# Patient Record
Sex: FEMALE | Race: WHITE | NOT HISPANIC OR LATINO
[De-identification: names, ages, dates, MRNs, and addresses within clinical notes are randomized per-mention and may not be internally consistent; named-entity substitution may affect disease eponyms.]

---

## 2019-03-09 ENCOUNTER — RX RENEWAL (OUTPATIENT)
Age: 74
End: 2019-03-09

## 2019-03-09 DIAGNOSIS — G47.52 REM SLEEP BEHAVIOR DISORDER: ICD-10-CM

## 2019-04-04 ENCOUNTER — APPOINTMENT (OUTPATIENT)
Dept: NEUROLOGY | Facility: CLINIC | Age: 74
End: 2019-04-04
Payer: MEDICARE

## 2019-04-04 VITALS
WEIGHT: 106 LBS | BODY MASS INDEX: 18.78 KG/M2 | TEMPERATURE: 97.7 F | SYSTOLIC BLOOD PRESSURE: 104 MMHG | OXYGEN SATURATION: 99 % | HEART RATE: 79 BPM | HEIGHT: 63 IN | DIASTOLIC BLOOD PRESSURE: 70 MMHG

## 2019-04-04 PROCEDURE — 99214 OFFICE O/P EST MOD 30 MIN: CPT

## 2019-04-04 PROCEDURE — 95983 ALYS BRN NPGT PRGRMG 15 MIN: CPT

## 2019-04-04 NOTE — PHYSICAL EXAM
[Motor Strength] : muscle strength was normal in all four extremities [FreeTextEntry1] : There is mild masking. Speech is 2+. There is no tremor. Her right side movements are slightly dyskinetic. Left UE 2+ and leg taps 2.5+. Tone is normal. Walks with depressed left armswing. Pull test is negative

## 2019-04-04 NOTE — DISCUSSION/SUMMARY
[FreeTextEntry1] : Patient with mild right side dyskinesia with possible dystonic wearing off component. Left side bradykinesia, I suspect, is causing her to be more aware of her right side motoric improvements and subtle dyskinesia as she does not have any focal motor weakness. \par \par Overnight hypokinesia\par \par Fecal incontinence may be 2/2 constipation\par \par Recommendations\par - DBS adjusted\par DBS programming 10minutes.\par - increase 1PM sinemet dose to 1.5 tabs\par - increase sinemet CR 25/100 1.5 tabs qhs\par f/u with GI regarding w/u for FI\par

## 2019-04-04 NOTE — HISTORY OF PRESENT ILLNESS
[FreeTextEntry1] : Patient with PD s/p bilateral STN DBS who returns for followup. Patient reports that she is cycling for PD. States that her left leg agility is not as good as her right leg. However, in general her right side feels weaker. Around 5PM, her right arm postures and improves after taking extra tab of sinemet. This occurs 3-4 times per week. She feels that her balance has improved and has no falls. Denies any freezing episodes. For the past two weeks she has been having episodes of fecal incontinence. Sleeps well with klonopin with no AM grogginess. When she takes her sinemet ER early, she feels that it does not last through the night as she can experience some overnight stiffness and slowness. \par \par PD meds:\par sinemet 25/100 1 tab 5 times per day (6-10-2-5-+/-\par sinemet 25/100 1 tab @730PM\par bupropion 75mg BID\par klonopin 1mg qhs\par \par

## 2019-04-08 ENCOUNTER — RX RENEWAL (OUTPATIENT)
Age: 74
End: 2019-04-08

## 2019-04-08 DIAGNOSIS — F32.9 MAJOR DEPRESSIVE DISORDER, SINGLE EPISODE, UNSPECIFIED: ICD-10-CM

## 2019-05-01 ENCOUNTER — RX RENEWAL (OUTPATIENT)
Age: 74
End: 2019-05-01

## 2019-05-28 ENCOUNTER — RX RENEWAL (OUTPATIENT)
Age: 74
End: 2019-05-28

## 2019-06-28 ENCOUNTER — RX RENEWAL (OUTPATIENT)
Age: 74
End: 2019-06-28

## 2019-07-18 ENCOUNTER — APPOINTMENT (OUTPATIENT)
Dept: NEUROLOGY | Facility: CLINIC | Age: 74
End: 2019-07-18
Payer: MEDICARE

## 2019-07-18 VITALS
BODY MASS INDEX: 19.14 KG/M2 | TEMPERATURE: 98 F | HEART RATE: 84 BPM | OXYGEN SATURATION: 98 % | WEIGHT: 108.03 LBS | HEIGHT: 63 IN

## 2019-07-18 VITALS — SYSTOLIC BLOOD PRESSURE: 121 MMHG | DIASTOLIC BLOOD PRESSURE: 86 MMHG

## 2019-07-18 PROCEDURE — 99214 OFFICE O/P EST MOD 30 MIN: CPT

## 2019-07-18 PROCEDURE — 95983 ALYS BRN NPGT PRGRMG 15 MIN: CPT

## 2019-07-19 NOTE — DISCUSSION/SUMMARY
[FreeTextEntry1] : PD with b/l STN DBS with increased off time. Suspect delay absorption due to slow gut transit. \par \par Plan\par DBS adjusted\par Alternate dose of sinemet IR 1.5 and 1 tab q3hrs (5doses/d)\par cont sinemet ER 25/100 1.5 tab @qhs\par f/u GI for constipation management \par cont exercising\par \par RTO 6months\par

## 2019-07-19 NOTE — HISTORY OF PRESENT ILLNESS
[FreeTextEntry1] : Patient with PD s/p bilateral STN DBS who returns for followup.\par \par She has intermittent left hand tremors in the late evenings. [facility] and has some problems raising her left foot when walking. This worsens as the day progresses\par Takes c/l 20 mninutes to kick in\par She reports that her vision is blurry constantly. \par Miralax and metamucil was prescribed by GI with some benefit. But only has weekly BMs\par Sinemet ER has improved overnight slowness and RLS. \par \par PD meds:\par sinemet 25/100 1 tab 5 times per day (71030-130, 430), 1.5 tabs @7P\par sinemet ER 25/100 1.5 tab @qhs\par bupropion 75mg BID\par klonopin 1mg qhs\par \par

## 2019-07-19 NOTE — PHYSICAL EXAM
[FreeTextEntry1] : There is mild masking. EOMI. Tremor is absent. There is mild left side bradykinesia. TOne is normal. Walks with good SL and no FOG. Left armswing is depressed

## 2019-07-19 NOTE — PROCEDURE
[FreeTextEntry1] : Deep Brain Stimulation Programming: Refer to scanned form(s)\par R IPG 2.90V\par 1-2+: 2.7mA/70/130 --->2.9mA\par \par L IPG-2.94\par 1-2.1V/6-/130\par

## 2019-07-26 ENCOUNTER — RX RENEWAL (OUTPATIENT)
Age: 74
End: 2019-07-26

## 2019-07-28 ENCOUNTER — RX RENEWAL (OUTPATIENT)
Age: 74
End: 2019-07-28

## 2019-08-07 ENCOUNTER — TRANSCRIPTION ENCOUNTER (OUTPATIENT)
Age: 74
End: 2019-08-07

## 2019-08-26 ENCOUNTER — RX RENEWAL (OUTPATIENT)
Age: 74
End: 2019-08-26

## 2019-09-04 ENCOUNTER — RX RENEWAL (OUTPATIENT)
Age: 74
End: 2019-09-04

## 2019-09-23 ENCOUNTER — RX RENEWAL (OUTPATIENT)
Age: 74
End: 2019-09-23

## 2019-10-25 ENCOUNTER — RX RENEWAL (OUTPATIENT)
Age: 74
End: 2019-10-25

## 2019-11-25 ENCOUNTER — RX RENEWAL (OUTPATIENT)
Age: 74
End: 2019-11-25

## 2019-11-25 ENCOUNTER — MEDICATION RENEWAL (OUTPATIENT)
Age: 74
End: 2019-11-25

## 2019-12-24 ENCOUNTER — RX RENEWAL (OUTPATIENT)
Age: 74
End: 2019-12-24

## 2020-01-23 ENCOUNTER — APPOINTMENT (OUTPATIENT)
Dept: NEUROLOGY | Facility: CLINIC | Age: 75
End: 2020-01-23
Payer: MEDICARE

## 2020-01-23 VITALS
HEART RATE: 90 BPM | OXYGEN SATURATION: 95 % | DIASTOLIC BLOOD PRESSURE: 62 MMHG | HEIGHT: 63 IN | TEMPERATURE: 97.1 F | SYSTOLIC BLOOD PRESSURE: 95 MMHG | WEIGHT: 108 LBS | BODY MASS INDEX: 19.14 KG/M2

## 2020-01-23 DIAGNOSIS — H04.123 DRY EYE SYNDROME OF BILATERAL LACRIMAL GLANDS: ICD-10-CM

## 2020-01-23 DIAGNOSIS — Z87.19 PERSONAL HISTORY OF OTHER DISEASES OF THE DIGESTIVE SYSTEM: ICD-10-CM

## 2020-01-23 PROCEDURE — 95983 ALYS BRN NPGT PRGRMG 15 MIN: CPT

## 2020-01-23 PROCEDURE — 99214 OFFICE O/P EST MOD 30 MIN: CPT

## 2020-01-23 RX ORDER — GABAPENTIN 100 MG/1
100 CAPSULE ORAL
Qty: 90 | Refills: 2 | Status: ACTIVE | COMMUNITY
Start: 2020-01-23 | End: 1900-01-01

## 2020-01-23 NOTE — PROCEDURE
[FreeTextEntry1] : Deep Brain Stimulation Programming:  \par \par R IPG - 2.87V battery\par 1- 2+\par 2.9mA/70/130 \par \par L IPG - 2.92 battery\par C+ 1- \par 2.1V/60/130\par Increase amplitude to 2.3 V

## 2020-01-23 NOTE — END OF VISIT
[>50% of Time Spent on Counseling for ____] : Greater than 50% of the encounter time was spent on counseling for [unfilled] [Time Spent: ___ minutes] : I have spent [unfilled] minutes of face to face time with the patient [] : Fellow [FreeTextEntry3] : Patient counseled on DBS changes and other recommendations

## 2020-01-23 NOTE — HISTORY OF PRESENT ILLNESS
[FreeTextEntry1] : Patient with Hx of PD since 1998 with symptoms since 1994, started Sinemet in 1995, s/p bilateral STN DBS who returns for followup.\par \par Parkinson's wise she feels stable. She feels her right side is weaker. She fell the week before [place] on her right side, no injury. She did not trip, she just fell suddenly. No dizziness when standing. Alternating doses she has not a big difference. She is getting restless leg in her left leg every night. Gabapentin has helped in the past. She is stiffer in the morning, but still independent in ADLs. The Sinemet ER is Not helping with RLS. After her 5pm dose, she notices her right shoulder moving involuntarily.\par \par Takes c/l 20 minutes to kick in\par She reports that her vision is blurry constantly. She was diagnosed with macular degeneration and a hole in her retina that was repaired.\par She has dry mouth, eyes and skin. Currently undergoing work up for skin cancer.\par Miralax daily was prescribed by GI, but has more diarrhea type stools. But only has weekly BMs usually, but she can then have diarrhea for 3 days.\par \par She plans to restart pedalling for Parkinson's,\par \par PD meds:\par sinemet 25/100 1.5 and 1 tab alternating, 5 times per day (7-1030-130, 5p, 7P)\par sinemet ER 25/100 1.5 tab @qhs\par bupropion 75mg BID\par klonopin 1mg qhs\par \par

## 2020-01-23 NOTE — DISCUSSION/SUMMARY
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 Had retinal tear repaired. Getting worked up for skin cancer. Discussed that these findings are likely not related to her Parkinson's disease.\par \par Plan\par - L DBS adjusted\par - Continue Alternate dose of sinemet IR 1.5 and 1 tab q3hrs (5 doses/d)\par - cont sinemet ER 25/100 1.5 tab @qhs\par - f/u GI for constipation management \par - Increase exercise\par - Sjogren's syndrome Antibodies for symptoms of dry eyes/mouth\par - Start Gabapentin 100mg at bedtime, can titrate up to 3 tabs at bedtime if needed\par \par RTO 6 months

## 2020-01-23 NOTE — PHYSICAL EXAM
[FreeTextEntry1] : There is mild masking. EOMI. Tremor is absent. There is mild left side bradykinesia. Tone is normal. Walks with good SL and no FOG. Postural reflexes are normal. Intermittent, right hand and right leg dyskinesias, intermittently the head is involved.

## 2020-02-19 ENCOUNTER — RX RENEWAL (OUTPATIENT)
Age: 75
End: 2020-02-19

## 2020-03-23 ENCOUNTER — TRANSCRIPTION ENCOUNTER (OUTPATIENT)
Age: 75
End: 2020-03-23

## 2020-04-21 ENCOUNTER — TRANSCRIPTION ENCOUNTER (OUTPATIENT)
Age: 75
End: 2020-04-21

## 2020-05-21 ENCOUNTER — RX RENEWAL (OUTPATIENT)
Age: 75
End: 2020-05-21

## 2020-05-21 ENCOUNTER — TRANSCRIPTION ENCOUNTER (OUTPATIENT)
Age: 75
End: 2020-05-21

## 2020-06-22 ENCOUNTER — TRANSCRIPTION ENCOUNTER (OUTPATIENT)
Age: 75
End: 2020-06-22

## 2020-07-20 ENCOUNTER — RX RENEWAL (OUTPATIENT)
Age: 75
End: 2020-07-20

## 2020-07-21 ENCOUNTER — TRANSCRIPTION ENCOUNTER (OUTPATIENT)
Age: 75
End: 2020-07-21

## 2020-07-23 ENCOUNTER — APPOINTMENT (OUTPATIENT)
Dept: NEUROLOGY | Facility: CLINIC | Age: 75
End: 2020-07-23

## 2020-07-24 ENCOUNTER — TRANSCRIPTION ENCOUNTER (OUTPATIENT)
Age: 75
End: 2020-07-24

## 2020-07-28 ENCOUNTER — TRANSCRIPTION ENCOUNTER (OUTPATIENT)
Age: 75
End: 2020-07-28

## 2020-08-20 ENCOUNTER — APPOINTMENT (OUTPATIENT)
Dept: NEUROLOGY | Facility: CLINIC | Age: 75
End: 2020-08-20
Payer: MEDICARE

## 2020-08-20 PROCEDURE — 99214 OFFICE O/P EST MOD 30 MIN: CPT | Mod: 95

## 2020-08-20 RX ORDER — CARBIDOPA AND LEVODOPA 25; 100 MG/1; MG/1
25-100 TABLET ORAL
Qty: 675 | Refills: 3 | Status: ACTIVE | COMMUNITY
Start: 2018-11-13 | End: 1900-01-01

## 2020-08-20 NOTE — DISCUSSION/SUMMARY
[FreeTextEntry1] : PD with b/l STN DBS with increase sense of imbalance. \par RBD\par \par Patient was counseled on the following recommendations:\par \par - DBS unchanged\par - Suggested raising all doses of c/l to 1.5 tabs to address changes in gait\par - cont klonopin 1mg qhs, will increase it if RBD worsens\par - increase exercises\par \par f/u 3months
Attending Attestation (For Attendings USE Only)...

## 2020-08-20 NOTE — PHYSICAL EXAM
[FreeTextEntry1] : There is mild masking. Left side bradykinesia 2+. Tremor is absent. Walks with good SL. Slight hesitation on turning. Posture is good

## 2020-08-20 NOTE — HISTORY OF PRESENT ILLNESS
[FreeTextEntry1] : Patient with PD s/p bilateral STN DBS who returns for followup.\par \par Reports that over the summer she feels unsteady. She has not fallen and feels her left foot is dragging. \par She notices her balance issues around midday\par Tremor is overall controlled but breakthrough with fatigue or delays in taking her dose\par Has had increased episodes of RBD over the past 2-3 weeks. \par She is awaiting dental surgery with anesthesia. \par \par \par PD meds:\par sinemet 25/100 1.5/1 tabs every 4hrs ( 6.5 tabs)\par sinemet ER 25/100 1.5 tab @qhs\par bupropion 75mg BID\par klonopin 1mg qhs\par \par

## 2020-09-22 ENCOUNTER — RX RENEWAL (OUTPATIENT)
Age: 75
End: 2020-09-22

## 2020-10-19 ENCOUNTER — RX RENEWAL (OUTPATIENT)
Age: 75
End: 2020-10-19

## 2020-10-31 ENCOUNTER — RX RENEWAL (OUTPATIENT)
Age: 75
End: 2020-10-31

## 2020-10-31 RX ORDER — BUPROPION HYDROCHLORIDE 75 MG/1
75 TABLET, FILM COATED ORAL TWICE DAILY
Qty: 180 | Refills: 0 | Status: ACTIVE | COMMUNITY
Start: 2018-09-13 | End: 1900-01-01

## 2020-11-12 ENCOUNTER — APPOINTMENT (OUTPATIENT)
Dept: NEUROLOGY | Facility: CLINIC | Age: 75
End: 2020-11-12
Payer: MEDICARE

## 2020-11-12 PROCEDURE — 99214 OFFICE O/P EST MOD 30 MIN: CPT | Mod: 95,25

## 2020-11-12 RX ORDER — CARBIDOPA AND LEVODOPA 25; 100 MG/1; MG/1
25-100 TABLET, EXTENDED RELEASE ORAL
Qty: 135 | Refills: 3 | Status: ACTIVE | COMMUNITY
Start: 2019-01-11 | End: 1900-01-01

## 2020-11-13 NOTE — HISTORY OF PRESENT ILLNESS
[FreeTextEntry1] : Patient with PD s/p bilateral STN DBS who returns for followup.\par She reports some increased imbalance. Tends to scuff with her left foot. \par Experiences freezing when she initiates gait\par Sleep is more fragmented\par Anxiety has increased. Has not been socializing much due to COVID. Stay indoors most of the time\par Meds wear off sooner\par Left side has been tremoring more. \par \par \par PD meds:\par sinemet 25/100 1.5 tabs every 4hrs ( 7.5 tabs)\par sinemet ER 25/100 1.5 tab @qhs\par bupropion 75mg BID\par klonopin 1mg qhs\par \par

## 2020-11-13 NOTE — PHYSICAL EXAM
[FreeTextEntry1] : There is mild masking. Speech is 1+ Mild tremor in left hand. Left side braydkinesia 2+. Walks with reduced SL. Left side looks stiff when walking. Loses balance slightly during turns

## 2020-11-13 NOTE — DISCUSSION/SUMMARY
[FreeTextEntry1] : PD with b/l STN DBS with increased left side symptoms possibly 2/2 lower IPG level\par \par Patient was counseled on the following recommendations:\par \par - DBS unchanged. Will monitor battery monthly and send me messages with the level. Plan to refer to Dr. Traore when IPG <2.75\par - Suggested raising 2 doses of c/l to 2tabs to address changes in gait (AM and PM dose_\par - cont klonopin 1mg qhs, will increase it if RBD worsens\par - increase exercising\par \par f/u 3months

## 2020-12-18 ENCOUNTER — RX RENEWAL (OUTPATIENT)
Age: 75
End: 2020-12-18

## 2020-12-21 ENCOUNTER — TRANSCRIPTION ENCOUNTER (OUTPATIENT)
Age: 75
End: 2020-12-21

## 2020-12-22 ENCOUNTER — NON-APPOINTMENT (OUTPATIENT)
Age: 75
End: 2020-12-22

## 2021-01-15 ENCOUNTER — APPOINTMENT (OUTPATIENT)
Dept: NEUROSURGERY | Facility: CLINIC | Age: 76
End: 2021-01-15
Payer: MEDICARE

## 2021-01-15 VITALS
SYSTOLIC BLOOD PRESSURE: 101 MMHG | OXYGEN SATURATION: 97 % | DIASTOLIC BLOOD PRESSURE: 67 MMHG | HEIGHT: 63 IN | TEMPERATURE: 97.6 F | WEIGHT: 108 LBS | HEART RATE: 81 BPM | RESPIRATION RATE: 18 BRPM | BODY MASS INDEX: 19.14 KG/M2

## 2021-01-15 DIAGNOSIS — G20 PARKINSON'S DISEASE: ICD-10-CM

## 2021-01-15 DIAGNOSIS — Z96.89 PRESENCE OF OTHER SPECIFIED FUNCTIONAL IMPLANTS: ICD-10-CM

## 2021-01-15 DIAGNOSIS — Z45.42 ENC FOR ADJUSTMENT AND MANAGEMENT OF NEUROSTIMULATOR: ICD-10-CM

## 2021-01-15 PROCEDURE — 99072 ADDL SUPL MATRL&STAF TM PHE: CPT

## 2021-01-15 PROCEDURE — 99203 OFFICE O/P NEW LOW 30 MIN: CPT

## 2021-01-18 NOTE — HISTORY OF PRESENT ILLNESS
[> 3 months] : more  than 3 months [FreeTextEntry1] : DBS IPG needs to be replaced [de-identified] : 74 yo female with PMH of PD s/p DBS in 2015 (Dr. Bennett), Micah cataract 2014, and macular degeneration.  She arrives for an initial consult for DBS IPG replacement.  She has had very good symptom control and wishes to continue receiving this benefit.\par \par 12/22/2020 [employer] IPG interrogation by Dr. Miller\par Left 2.76\par Right 2.88

## 2021-01-18 NOTE — REASON FOR VISIT
[Consultation] : a consultation visit [Referred By: _________] : Patient was referred by KURTIS [Spouse] : spouse [FreeTextEntry1] : existing DBS IPG replacement

## 2021-01-18 NOTE — ASSESSMENT
[FreeTextEntry1] : 74 yo female whose DBS IPG has reached end of life and is indicated for replacement.  She is receiving the benefit of good symptom control with the current [employer] DBS IPG stimulation, and would like to continue.  After discussion of the risks, benefits, and alternatives to IGP replacement, she wishes to proceed.\par \par 1)  Schedule DBS IPG replacement\par \par 12/22/2020:\par Left 2.76\par Right 2.88

## 2021-01-18 NOTE — REVIEW OF SYSTEMS
[Poor Coordination] : poor coordination [As Noted in HPI] : as noted in HPI [Negative] : Respiratory [Abdominal Pain] : no abdominal pain [Vomiting] : no vomiting [Diarrhea] : no diarrhea [Dysuria] : no dysuria [Incontinence] : no incontinence [Easy Bleeding] : no tendency for easy bleeding [Easy Bruising] : no tendency for easy bruising

## 2021-01-18 NOTE — PHYSICAL EXAM
[General Appearance - In No Acute Distress] : in no acute distress [General Appearance - Alert] : alert [Oriented To Time, Place, And Person] : oriented to person, place, and time [Impaired Insight] : insight and judgment were intact [Cranial Nerves Optic (II)] : visual acuity intact bilaterally,  pupils equal round and reactive to light [Cranial Nerves Oculomotor (III)] : extraocular motion intact [Cranial Nerves Trigeminal (V)] : facial sensation intact symmetrically [Cranial Nerves Vestibulocochlear (VIII)] : hearing was intact bilaterally [Cranial Nerves Facial (VII)] : face symmetrical [Cranial Nerves Glossopharyngeal (IX)] : tongue and palate midline [Cranial Nerves Accessory (XI - Cranial And Spinal)] : head turning and shoulder shrug symmetric [Cranial Nerves Hypoglossal (XII)] : there was no tongue deviation with protrusion [Normal] : normal [PERRL With Normal Accommodation] : pupils were equal in size, round, reactive to light, with normal accommodation [Sclera] : the sclera and conjunctiva were normal [Hearing Threshold Finger Rub Not Graves] : hearing was normal [Neck Appearance] : the appearance of the neck was normal [Respiration, Rhythm And Depth] : normal respiratory rhythm and effort [] : no respiratory distress [Edema] : there was no peripheral edema [Abnormal Walk] : normal gait [Skin Color & Pigmentation] : normal skin color and pigmentation [Limited Balance] : balance was intact [FreeTextEntry1] : ambulates Independently with steady gait at this time

## 2021-01-25 ENCOUNTER — TRANSCRIPTION ENCOUNTER (OUTPATIENT)
Age: 76
End: 2021-01-25

## 2021-01-26 ENCOUNTER — TRANSCRIPTION ENCOUNTER (OUTPATIENT)
Age: 76
End: 2021-01-26

## 2021-01-26 DIAGNOSIS — Z41.9 ENCOUNTER FOR PROCEDURE FOR PURPOSES OTHER THAN REMEDYING HEALTH STATE, UNSPECIFIED: ICD-10-CM

## 2021-02-04 ENCOUNTER — TRANSCRIPTION ENCOUNTER (OUTPATIENT)
Age: 76
End: 2021-02-04

## 2021-02-05 ENCOUNTER — OUTPATIENT (OUTPATIENT)
Dept: OUTPATIENT SERVICES | Facility: HOSPITAL | Age: 76
LOS: 1 days | Discharge: HOME | End: 2021-02-05
Payer: MEDICARE

## 2021-02-05 ENCOUNTER — APPOINTMENT (OUTPATIENT)
Dept: NEUROSURGERY | Facility: AMBULATORY SURGERY CENTER | Age: 76
End: 2021-02-05

## 2021-02-05 PROCEDURE — 95977 ALYS CPLX CN NPGT PRGRMG: CPT

## 2021-02-05 PROCEDURE — 61886 IMPLANT NEUROSTIM ARRAYS: CPT

## 2021-02-11 ENCOUNTER — TRANSCRIPTION ENCOUNTER (OUTPATIENT)
Age: 76
End: 2021-02-11

## 2021-02-16 ENCOUNTER — TRANSCRIPTION ENCOUNTER (OUTPATIENT)
Age: 76
End: 2021-02-16

## 2021-02-17 ENCOUNTER — TRANSCRIPTION ENCOUNTER (OUTPATIENT)
Age: 76
End: 2021-02-17

## 2021-02-19 ENCOUNTER — APPOINTMENT (OUTPATIENT)
Dept: NEUROSURGERY | Facility: CLINIC | Age: 76
End: 2021-02-19
Payer: MEDICARE

## 2021-02-19 PROCEDURE — 99024 POSTOP FOLLOW-UP VISIT: CPT

## 2021-02-22 ENCOUNTER — TRANSCRIPTION ENCOUNTER (OUTPATIENT)
Age: 76
End: 2021-02-22

## 2021-02-22 RX ORDER — CLONAZEPAM 1 MG/1
1 TABLET ORAL
Qty: 30 | Refills: 0 | Status: ACTIVE | COMMUNITY
Start: 2019-03-09 | End: 1900-01-01

## 2021-02-22 NOTE — REASON FOR VISIT
[de-identified] : s/p 2/5/21 replacement of DBS IPG (MT SC IPG x 2 replaced with single MT RC IPG) @ [facility]

## 2021-02-22 NOTE — HISTORY OF PRESENT ILLNESS
[FreeTextEntry1] : 76 yo female with PMH of PD s/p DBS in 2015 (Dr. Bennett), Micah cataract 2014, and macular degeneration.  She arrives for an initial post op visit s/p DBS IPG replacement as above.  \par \par

## 2021-02-22 NOTE — ASSESSMENT
[FreeTextEntry1] : s/p DBS battery replacement\par No issues with incisions\par Patient knows to call with any questions

## 2021-04-20 LAB — SARS-COV-2 N GENE NPH QL NAA+PROBE: NOT DETECTED

## 2021-04-21 ENCOUNTER — TRANSCRIPTION ENCOUNTER (OUTPATIENT)
Age: 76
End: 2021-04-21

## 2021-09-07 ENCOUNTER — RX RENEWAL (OUTPATIENT)
Age: 76
End: 2021-09-07

## 2021-09-08 ENCOUNTER — RX RENEWAL (OUTPATIENT)
Age: 76
End: 2021-09-08